# Patient Record
Sex: FEMALE
[De-identification: names, ages, dates, MRNs, and addresses within clinical notes are randomized per-mention and may not be internally consistent; named-entity substitution may affect disease eponyms.]

---

## 2023-07-11 PROBLEM — Z00.00 ENCOUNTER FOR PREVENTIVE HEALTH EXAMINATION: Status: ACTIVE | Noted: 2023-07-11

## 2023-07-12 ENCOUNTER — APPOINTMENT (OUTPATIENT)
Dept: UROLOGY | Facility: CLINIC | Age: 83
End: 2023-07-12
Payer: MEDICARE

## 2023-07-12 ENCOUNTER — LABORATORY RESULT (OUTPATIENT)
Age: 83
End: 2023-07-12

## 2023-07-12 VITALS
OXYGEN SATURATION: 97 % | WEIGHT: 124 LBS | BODY MASS INDEX: 24.35 KG/M2 | TEMPERATURE: 97.6 F | HEIGHT: 60 IN | SYSTOLIC BLOOD PRESSURE: 122 MMHG | HEART RATE: 66 BPM | DIASTOLIC BLOOD PRESSURE: 68 MMHG

## 2023-07-12 DIAGNOSIS — N20.0 CALCULUS OF KIDNEY: ICD-10-CM

## 2023-07-12 PROCEDURE — 99204 OFFICE O/P NEW MOD 45 MIN: CPT

## 2023-07-12 NOTE — LETTER BODY
[Dear  ___] : Dear  [unfilled], [Courtesy Letter:] : I had the pleasure of seeing your patient, [unfilled], in my office today. [FreeTextEntry1] : Please see my note for more details and feel free to contact me with any questions/concerns. \par Regards, \par Patrick\par

## 2023-07-12 NOTE — ASSESSMENT
[FreeTextEntry1] : Assessment: \par BENJAMIN ALVAREZ is a 83 year F with a recently passed right ureteral stone. No other concurrent renal stone. She is a first time stone former. \par  \par We discussed generalized stone prevention strategies, metabolic evaluation (serum chemistry profile and 24-hour urine collection +/- PTH testing if serum Ca is elevated), and the natural history of kidney stone disease. I explained that first-time stone formers generally do not need a complete metabolic work-up in the absence of risk factors. However, without behavioral and dietary modification, they are at a heightened risk of developing future symptomatic stones: 10% at 2 years, 20% at 5 years, and 30% at 10 years ([facility] data). In recurrent stone formers, the risk of recurrence is considerably higher with a lifetime recurrence rate of 60-80%. Risk factors include stone type, total stone volume, family history, multiple stones, and many medical comorbidities (DM, HTN, HLD, obesity, gout, HPT).  \par   \par Generalized stone prevention counseling was provided as follows:  \par 1. High fluid intake. The goal should be to drink enough to produce 2.5 liters (quarts) of urine daily. Most studies have shown that high fluid volume intake will decrease the risk of stone formation. Research generally indicates that there appears to be little difference between hard and soft water in the formation of kidney stones. Lemon juice added to the water may also aid with increasing urine pH, urine citric acid and reducing stone formation.  \par   \par 2. Dietary recommendations. The key to all dietary recommendations is moderation.  \par · Decrease animal protein consumption. High protein intake increases urinary calcium, oxalate, and uric acid excretion into the urine - all of which will increase the probability of stone formation.  \par · Decrease sodium intake by avoiding heavily salted foods, and resist adding salt to food at the table. Sodium restriction is widely recognized as an important element of dietary prevention of recurrent kidney stones.  \par · Dietary calcium. Patient should consume a daily recommended allowance of dietary calcium (800-1200 mg). Patients who take in too much Ca or too little Ca are at an increased risk of recurrent stone formation. Dietary calcium is preferable to supplements. Calcium supplementation can be safe when the calcium is taken with meals, rather than at bedtime. Another suggestion for patients who have been recommended to take calcium supplements for their bone health is to consider using calcium citrate, an over-the-counter preparation that provides 950 mg of calcium citrate and 200 mg of elemental calcium in each tablet.  \par · Avoid excess intake of foods with high oxalate content, including dark leafy greens, beets, nuts, tea, coffee, and chocolate. Strict avoidance of oxalate is not necessary in most cases.  \par · Avoid high doses of vitamin C. Intake should be limited to a maximum daily dose of less than 2 gm (2,000 mg).  \par   \par  \par Plan:  \par -Follow up visit in 1 month with renal US\par -Stone analysis \par -Continue with general stone prevention recommendations as discussed above\par \par \par Patrick Mcallister MD\par Director, Endourology and [facility]\par [employer] [facility]\par  of Urology\par [facility] [employer] at [facility]

## 2023-07-12 NOTE — HISTORY OF PRESENT ILLNESS
[FreeTextEntry1] : Name BENJAMIN ALVAREZ \par MRN [id number] \par  Mar 26 1940 \par ------------------------------------------------------------------------------------------------------------------------------------------- \par Date of First Visit: 2023\par Referring Provider/PCP: Dr. Dotty Orozco \par ------------------------------------------------------------------------------------------------------------------------------------------- \par CC: kidney stones \par \par History of Present Illness: BENJAMIN ALVAREZ is a 83 year F who presents for evaluation of kidney stones. She was evaluated at [facility] ED on 2023 for right sided flank pain with chills and nausea. CT demonstrated a 4mm right UVJ stone with mild hydronephrosis. UA with 25-50 RBCs, nitrite negative, creatinine 0.70, WBC 9.7. Discharged with tamsulosin. She passed the stone a few days later and has been feeling well since then. \par \par Imaging: CT scan from 2023 (report only). Findings: There is a 4mm obstructive renal stone at the right UVJ causing mild upstream hydroureteronephrosis. The kidneys enhance symmetrically. No renal mass. No focal urinary bladder wall thickening. Mild hepatomegaly and hepatic steatosis. \par \par Previous urine cultures:  \par  \par Kidney Stone History:  \par First-time stone former - Yes\par Concurrent asymptomatic stone(s) - No\par Previous stone surgeries - No \par Previous passed stones - No\par Comorbidities - non-contributory \par Family history of kidney stones - No\par Previous metabolic evaluation - No

## 2023-07-19 ENCOUNTER — NON-APPOINTMENT (OUTPATIENT)
Age: 83
End: 2023-07-19

## 2023-08-11 ENCOUNTER — APPOINTMENT (OUTPATIENT)
Dept: UROLOGY | Facility: CLINIC | Age: 83
End: 2023-08-11
Payer: MEDICARE

## 2023-08-11 PROCEDURE — 76770 US EXAM ABDO BACK WALL COMP: CPT

## 2023-08-11 PROCEDURE — 99213 OFFICE O/P EST LOW 20 MIN: CPT

## 2023-08-11 NOTE — HISTORY OF PRESENT ILLNESS
[FreeTextEntry1] : Name BENJAMIN ALVAREZ MRN [id number]  Mar 26 1940 ------------------------------------------------------------------------------------------------------------------------------------------- Date of First Visit: 2023 Referring Provider/PCP: Dr. Dotty Orozco ------------------------------------------------------------------------------------------------------------------------------------------- CC: kidney stones  History of Present Illness: BENJAMIN ALVAREZ is a 83 year F who presents for evaluation of kidney stones. She was evaluated at [facility] ED on 2023 for right sided flank pain with chills and nausea. CT demonstrated a 4mm right UVJ stone with mild hydronephrosis. UA with 25-50 RBCs, nitrite negative, creatinine 0.70, WBC 9.7. Discharged with tamsulosin. She passed the stone a few days later and has been feeling well since then.  Imaging: CT scan from 2023 (report only). Findings: There is a 4mm obstructive renal stone at the right UVJ causing mild upstream hydroureteronephrosis. The kidneys enhance symmetrically. No renal mass. No focal urinary bladder wall thickening. Mild hepatomegaly and hepatic steatosis.  Previous urine cultures:  Kidney Stone History: First-time stone former - Yes Concurrent asymptomatic stone(s) - No Previous stone surgeries - No Previous passed stones - No Comorbidities - non-contributory Family history of kidney stones - No Previous metabolic evaluation - No -------------------------------------------------------------------------------------------------------------------------------------------  Interval History (2023): Patient presents for ultrasound and follow-up after spontaneous passage of a ureteral stone approximately one month ago.  Ultrasound performed in the office today demonstrates no evidence of hydronephrosis bilaterally.   Stone composition: 90% Calcium oxalate monohydrate.  10% Calcium oxalate dihydrate. No recent 24-hour urine urinalysis

## 2023-08-11 NOTE — ASSESSMENT
[FreeTextEntry1] : Assessment:   BENJAMIN ALVAREZ is a 83 year F with a recently passed right ureteral stone. First ever stone. No other concurrent renal stones.   Reviewed previously discussed results of recent [facility] 24-hour urinalysis and recommendations that may reduce the risk of stone growth and new stone formation.    Plan:   -Follow up as needed

## 2025-08-12 ENCOUNTER — NON-APPOINTMENT (OUTPATIENT)
Age: 85
End: 2025-08-12

## 2025-08-13 ENCOUNTER — APPOINTMENT (OUTPATIENT)
Dept: UROLOGY | Facility: CLINIC | Age: 85
End: 2025-08-13
Payer: MEDICARE

## 2025-08-13 VITALS
WEIGHT: 124 LBS | DIASTOLIC BLOOD PRESSURE: 73 MMHG | TEMPERATURE: 98 F | BODY MASS INDEX: 24.35 KG/M2 | SYSTOLIC BLOOD PRESSURE: 116 MMHG | HEIGHT: 60 IN | OXYGEN SATURATION: 97 % | HEART RATE: 66 BPM

## 2025-08-13 PROCEDURE — G2211 COMPLEX E/M VISIT ADD ON: CPT

## 2025-08-13 PROCEDURE — 76775 US EXAM ABDO BACK WALL LIM: CPT

## 2025-08-13 PROCEDURE — 99213 OFFICE O/P EST LOW 20 MIN: CPT
